# Patient Record
Sex: FEMALE | Race: WHITE | NOT HISPANIC OR LATINO | URBAN - METROPOLITAN AREA
[De-identification: names, ages, dates, MRNs, and addresses within clinical notes are randomized per-mention and may not be internally consistent; named-entity substitution may affect disease eponyms.]

---
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
## 2018-08-10 ENCOUNTER — RESULT REVIEW (OUTPATIENT)
Age: 18
End: 2018-08-10

## 2019-03-04 ENCOUNTER — APPOINTMENT (OUTPATIENT)
Dept: CARDIOLOGY | Facility: CLINIC | Age: 19
End: 2019-03-04

## 2019-05-21 ENCOUNTER — APPOINTMENT (OUTPATIENT)
Dept: CARDIOLOGY | Facility: CLINIC | Age: 19
End: 2019-05-21
Payer: COMMERCIAL

## 2019-05-21 ENCOUNTER — NON-APPOINTMENT (OUTPATIENT)
Age: 19
End: 2019-05-21

## 2019-05-21 VITALS
RESPIRATION RATE: 17 BRPM | SYSTOLIC BLOOD PRESSURE: 101 MMHG | BODY MASS INDEX: 17.36 KG/M2 | WEIGHT: 108 LBS | HEIGHT: 66 IN | HEART RATE: 87 BPM | DIASTOLIC BLOOD PRESSURE: 76 MMHG | OXYGEN SATURATION: 100 %

## 2019-05-21 DIAGNOSIS — Z82.49 FAMILY HISTORY OF ISCHEMIC HEART DISEASE AND OTHER DISEASES OF THE CIRCULATORY SYSTEM: ICD-10-CM

## 2019-05-21 PROCEDURE — 99205 OFFICE O/P NEW HI 60 MIN: CPT

## 2019-05-21 PROCEDURE — 93000 ELECTROCARDIOGRAM COMPLETE: CPT

## 2019-05-21 PROCEDURE — 93306 TTE W/DOPPLER COMPLETE: CPT

## 2021-10-13 NOTE — ED ADULT NURSE NOTE - OBJECTIVE STATEMENT
Received pt in  pt c/o depression denies Si/Hi/AVh at present calm & cooperative emotional support  provided eval on going.
No

## 2022-10-05 ENCOUNTER — APPOINTMENT (OUTPATIENT)
Dept: CARDIOLOGY | Facility: CLINIC | Age: 22
End: 2022-10-05

## 2022-11-16 ENCOUNTER — NON-APPOINTMENT (OUTPATIENT)
Age: 22
End: 2022-11-16

## 2022-11-16 ENCOUNTER — APPOINTMENT (OUTPATIENT)
Dept: CARDIOLOGY | Facility: CLINIC | Age: 22
End: 2022-11-16

## 2022-11-16 VITALS
WEIGHT: 120 LBS | SYSTOLIC BLOOD PRESSURE: 120 MMHG | BODY MASS INDEX: 19.29 KG/M2 | OXYGEN SATURATION: 95 % | HEIGHT: 66 IN | HEART RATE: 98 BPM | DIASTOLIC BLOOD PRESSURE: 74 MMHG | RESPIRATION RATE: 16 BRPM

## 2022-11-16 VITALS — SYSTOLIC BLOOD PRESSURE: 118 MMHG | DIASTOLIC BLOOD PRESSURE: 74 MMHG

## 2022-11-16 DIAGNOSIS — Q67.6 PECTUS EXCAVATUM: ICD-10-CM

## 2022-11-16 DIAGNOSIS — I77.819 AORTIC ECTASIA, UNSPECIFIED SITE: ICD-10-CM

## 2022-11-16 DIAGNOSIS — I77.810 THORACIC AORTIC ECTASIA: ICD-10-CM

## 2022-11-16 DIAGNOSIS — I34.1 NONRHEUMATIC MITRAL (VALVE) PROLAPSE: ICD-10-CM

## 2022-11-16 PROCEDURE — 93000 ELECTROCARDIOGRAM COMPLETE: CPT

## 2022-11-16 PROCEDURE — 99205 OFFICE O/P NEW HI 60 MIN: CPT | Mod: 25

## 2022-11-16 RX ORDER — BUPROPION HYDROCHLORIDE 300 MG/1
300 TABLET, EXTENDED RELEASE ORAL
Qty: 30 | Refills: 0 | Status: DISCONTINUED | COMMUNITY
Start: 2018-10-24 | End: 2022-11-16

## 2023-06-07 ENCOUNTER — APPOINTMENT (OUTPATIENT)
Dept: CARDIOLOGY | Facility: CLINIC | Age: 23
End: 2023-06-07
Payer: COMMERCIAL

## 2023-06-07 PROCEDURE — 93306 TTE W/DOPPLER COMPLETE: CPT

## 2023-06-08 ENCOUNTER — NON-APPOINTMENT (OUTPATIENT)
Age: 23
End: 2023-06-08

## 2024-07-23 ENCOUNTER — EMERGENCY (EMERGENCY)
Facility: HOSPITAL | Age: 24
LOS: 0 days | Discharge: LEFT BEFORE DISCHARGE | End: 2024-07-23
Payer: COMMERCIAL

## 2024-07-23 ENCOUNTER — EMERGENCY (EMERGENCY)
Facility: HOSPITAL | Age: 24
LOS: 1 days | Discharge: HOME | End: 2024-07-23
Attending: EMERGENCY MEDICINE | Admitting: EMERGENCY MEDICINE
Payer: COMMERCIAL

## 2024-07-23 VITALS
SYSTOLIC BLOOD PRESSURE: 111 MMHG | DIASTOLIC BLOOD PRESSURE: 77 MMHG | RESPIRATION RATE: 16 BRPM | OXYGEN SATURATION: 98 % | HEART RATE: 70 BPM | TEMPERATURE: 98 F

## 2024-07-23 VITALS
WEIGHT: 122.58 LBS | RESPIRATION RATE: 16 BRPM | SYSTOLIC BLOOD PRESSURE: 128 MMHG | OXYGEN SATURATION: 95 % | TEMPERATURE: 99 F | DIASTOLIC BLOOD PRESSURE: 82 MMHG | HEART RATE: 78 BPM | HEIGHT: 66 IN

## 2024-07-23 VITALS — WEIGHT: 111.99 LBS | HEIGHT: 66 IN

## 2024-07-23 DIAGNOSIS — Z53.21 PROCEDURE AND TREATMENT NOT CARRIED OUT DUE TO PATIENT LEAVING PRIOR TO BEING SEEN BY HEALTH CARE PROVIDER: ICD-10-CM

## 2024-07-23 DIAGNOSIS — R10.31 RIGHT LOWER QUADRANT PAIN: ICD-10-CM

## 2024-07-23 LAB
APPEARANCE UR: ABNORMAL
BILIRUB UR-MCNC: NEGATIVE — SIGNIFICANT CHANGE UP
COLOR SPEC: YELLOW — SIGNIFICANT CHANGE UP
DIFF PNL FLD: ABNORMAL
GLUCOSE UR QL: NEGATIVE MG/DL — SIGNIFICANT CHANGE UP
HCG UR QL: NEGATIVE — SIGNIFICANT CHANGE UP
KETONES UR-MCNC: NEGATIVE MG/DL — SIGNIFICANT CHANGE UP
LEUKOCYTE ESTERASE UR-ACNC: ABNORMAL
NITRITE UR-MCNC: NEGATIVE — SIGNIFICANT CHANGE UP
PH UR: 6.5 — SIGNIFICANT CHANGE UP (ref 5–8)
PROT UR-MCNC: NEGATIVE MG/DL — SIGNIFICANT CHANGE UP
SP GR SPEC: 1.02 — SIGNIFICANT CHANGE UP (ref 1–1.03)
UROBILINOGEN FLD QL: 1 MG/DL — SIGNIFICANT CHANGE UP (ref 0.2–1)

## 2024-07-23 PROCEDURE — 99284 EMERGENCY DEPT VISIT MOD MDM: CPT

## 2024-07-23 PROCEDURE — 87086 URINE CULTURE/COLONY COUNT: CPT

## 2024-07-23 PROCEDURE — 76830 TRANSVAGINAL US NON-OB: CPT

## 2024-07-23 PROCEDURE — 81001 URINALYSIS AUTO W/SCOPE: CPT

## 2024-07-23 PROCEDURE — 99284 EMERGENCY DEPT VISIT MOD MDM: CPT | Mod: 25

## 2024-07-23 PROCEDURE — 76830 TRANSVAGINAL US NON-OB: CPT | Mod: 26

## 2024-07-23 PROCEDURE — L9991: CPT

## 2024-07-23 PROCEDURE — 81025 URINE PREGNANCY TEST: CPT

## 2024-07-23 PROCEDURE — 87077 CULTURE AEROBIC IDENTIFY: CPT

## 2024-07-23 RX ORDER — NITROFURANTOIN MACROCRYSTAL 100 MG
1 CAPSULE ORAL
Qty: 14 | Refills: 0
Start: 2024-07-23 | End: 2024-07-29

## 2024-07-23 RX ORDER — SODIUM CHLORIDE 0.9 % (FLUSH) 0.9 %
1000 SYRINGE (ML) INJECTION ONCE
Refills: 0 | Status: DISCONTINUED | OUTPATIENT
Start: 2024-07-23 | End: 2024-07-23

## 2024-07-23 NOTE — ED PROVIDER NOTE - ATTENDING APP SHARED VISIT CONTRIBUTION OF CARE
Iker West MD: I have personally performed a face to face diagnostic evaluation on this patient.  I have reviewed the PA note and agree with the history, exam, and plan of care, except as noted.  History and Exam by me shows same findings as documented

## 2024-07-23 NOTE — ED ADULT NURSE REASSESSMENT NOTE - NS ED NURSE REASSESS COMMENT FT1
Pt educated to stay and see a doctor for rule out torsion. Pt refusing to stay. Left at 18:27 with parent.

## 2024-07-23 NOTE — ED PROVIDER NOTE - OBJECTIVE STATEMENT
24-year-old female with past medical history of ovarian cyst presents today due to acute onset of right sided pelvic pain.  Patient reports that her last menstruation was 1 month ago but her menses is irregular due to IUD.  Patient reports that she had an acute onset of pain that radiated down her right leg, currently improved.  Patient was nauseous at that point.  Patient reports that it felt similar to the pain that she has felt with her ovarian cyst prior.  Patient denies dysuria, hematuria, vomiting, diarrhea, or any other complaints.

## 2024-07-23 NOTE — ED PROVIDER NOTE - NSFOLLOWUPINSTRUCTIONS_ED_ALL_ED_FT
Follow up with OBGYN as soon as possible. Return for fever, vomiting, worsening condition.    Urinary Tract Infection in Women    WHAT YOU NEED TO KNOW:    What is a urinary tract infection (UTI)? A UTI is caused by bacteria that get inside your urinary tract. Your urinary tract includes your kidneys, ureters, bladder, and urethra. A UTI is more common in your lower urinary tract, which includes your bladder and urethra.  Female Urinary System    What increases my risk for a UTI?    Older age    A urinary catheter or self-catheterization    Pregnancy    Urinary tract problems, such as a narrowing, kidney stones, or inability to empty your bladder completely    History of a UTI    Sexual intercourse    Menopause    Diabetes or obesity  What are the signs and symptoms of a UTI?    Urinating more often than usual, leaking urine, or waking from sleep to urinate    Pain or burning when you urinate    Pain or pressure in your lower abdomen and back    Urine that smells bad    Blood in your urine  How is a UTI diagnosed? Your healthcare provider will ask about your signs and symptoms. Your provider may press on your abdomen, sides, and back to check if you feel pain. You may need any of the following:    Urinalysis will show infection and your overall health.    Urine cultures may show which germ is causing your infection.    CT or MRI pictures may be used if you have UTIs often or do not respond to treatment. You may be given contrast liquid to help the pictures show up better. Tell a healthcare provider if you have ever had an allergic reaction to contrast liquid. Do not enter the MRI room with anything metal. Metal can cause serious injury. Tell a healthcare provider if you have any metal in or on your body.  How is a UTI treated?    Antibiotics treat a bacterial infection. If you have UTIs often (called recurrent UTIs), you may be given antibiotics to take regularly. You will be given directions for when and how to use antibiotics. The goal is to prevent UTIs but not cause antibiotic resistance by using antibiotics too often.    Medicines may be given to decrease pain and burning when you urinate. They will also help decrease the feeling that you need to urinate often. These medicines may make your urine orange or red.  What can I do to prevent a UTI?    Talk to your healthcare provider about your birth control method. You may need to change your method if it is increasing your risk for UTIs.    Empty your bladder often. Urinate and empty your bladder as soon as you feel the need. Do not hold your urine for long periods of time.    Wipe from front to back after you urinate or have a bowel movement. This will help prevent germs from getting into your urinary tract through your urethra.    Drink liquids as directed. Ask how much liquid to drink each day and which liquids are best for you. You may need to drink more liquids than usual to help flush out the bacteria. Do not drink alcohol, caffeine, or citrus juices. These can irritate your bladder and increase your symptoms. Your healthcare provider may recommend cranberry juice to help prevent a UTI.    Urinate before and after you have sex. This can help flush out bacteria passed during sex.    Do not douche or use feminine deodorants. These can change the chemical balance in your vagina.    Change sanitary pads or tampons often. This will help prevent germs from getting into your urinary tract.    Wear cotton underwear and clothes that are loose. Tight pants and nylon underwear can trap moisture and cause bacteria to grow.    Vaginal estrogen may be recommended. This medicine helps prevent UTIs in women who have gone through menopause or are in luz-menopause.    Do pelvic muscle exercises often. Pelvic muscle exercises may help you start and stop urinating. Strong pelvic muscles may help you empty your bladder easier. Squeeze these muscles tightly for 5 seconds like you are trying to hold back urine. Then relax for 5 seconds. Gradually work up to squeezing for 10 seconds. Do 3 sets of 15 repetitions a day, or as directed.  When should I seek immediate care?    You are urinating very little or not at all.    You have a high fever with shaking chills.    You have side or back pain that gets worse.  When should I call my doctor?    You have a fever.    You do not feel better after 2 days of taking antibiotics.    You have new symptoms, such as blood or pus in your urine.    You are vomiting.    You have questions or concerns about your condition or care.  CARE AGREEMENT:    You have the right to help plan your care. Learn about your health condition and how it may be treated. Discuss treatment options with your healthcare providers to decide what care you want to receive. You always have the right to refuse treatment.

## 2024-07-23 NOTE — ED PROVIDER NOTE - CLINICAL SUMMARY MEDICAL DECISION MAKING FREE TEXT BOX
Patient with rlq pain, sudden onset 4-5 hours ago. No resolved. Similar pain in the past with ovarian cysts. Has benign abd exam now. Will get sono and refer to OB/GYN

## 2024-07-23 NOTE — ED PROVIDER NOTE - CARE PROVIDER_API CALL
Dinorah Lopez  Obstetrics and Gynecology  [address] [place] [id number]  Phone: [phone]  Fax: [phone]  Follow Up Time: 1-3 Days

## 2024-07-23 NOTE — ED ADULT TRIAGE NOTE - CHIEF COMPLAINT QUOTE
Pt coming into the ED with c/o lower abdominal pain. Pt has hx of ovarian cysts. Pt recently had an IUD placed two months ago. LMP one month ago. pt endorses light vaginal bleeding. Pt also endorses urinary symptoms. unsure of fevers. Pt says "the pain is mostly to the lower right side".

## 2024-07-23 NOTE — ED ADULT TRIAGE NOTE - CHIEF COMPLAINT QUOTE
I started to get RLQ abdominal pain at 1530 and the pain peaked about 2 hours ago; I had 4 ovarian cysts on the right side before; I think the cyst popped because the pain Is better than it was; I was seen in urgent care because when I urinated I had some burning; pt took Ibuprofen 400mg at 1630

## 2024-07-23 NOTE — ED PROVIDER NOTE - PATIENT PORTAL LINK FT
You can access the FollowMyHealth Patient Portal offered by [facility] by registering at the following website: [email or web address]. By joining [employer]’s FollowMyHealth portal, you will also be able to view your health information using other applications (apps) compatible with our system.

## 2024-07-23 NOTE — ED ADULT NURSE NOTE - OBJECTIVE STATEMENT
24yr old female walked into ED accompanied by mom c/o RLQ abdominal pain since 1500 today, states pain worsened about 2 hours ago; hx ovarian cysts; pt states it feels very similar to past ovarian cysts symptoms; she thinks it ruptured because her pain has improved

## 2024-07-25 LAB
CULTURE RESULTS: ABNORMAL
SPECIMEN SOURCE: SIGNIFICANT CHANGE UP

## 2024-11-06 ENCOUNTER — APPOINTMENT (OUTPATIENT)
Dept: OBGYN | Facility: CLINIC | Age: 24
End: 2024-11-06

## 2025-04-09 NOTE — ED ADULT TRIAGE NOTE - RESPIRATORY RATE (BREATHS/MIN)
Last OV: 3/10/2025  Last RX:    Next scheduled apt: 6/17/2025      Pt called in stating his BS has been running 135. He is requesting a refill on Zepbound.     Please send refill to [facility] (W)     16